# Patient Record
Sex: MALE | Race: ASIAN | NOT HISPANIC OR LATINO
[De-identification: names, ages, dates, MRNs, and addresses within clinical notes are randomized per-mention and may not be internally consistent; named-entity substitution may affect disease eponyms.]

---

## 2024-05-01 ENCOUNTER — NON-APPOINTMENT (OUTPATIENT)
Age: 54
End: 2024-05-01

## 2024-05-02 ENCOUNTER — NON-APPOINTMENT (OUTPATIENT)
Age: 54
End: 2024-05-02

## 2024-05-02 ENCOUNTER — APPOINTMENT (OUTPATIENT)
Dept: SURGICAL ONCOLOGY | Facility: CLINIC | Age: 54
End: 2024-05-02
Payer: COMMERCIAL

## 2024-05-02 VITALS
HEART RATE: 75 BPM | DIASTOLIC BLOOD PRESSURE: 97 MMHG | SYSTOLIC BLOOD PRESSURE: 158 MMHG | HEIGHT: 67 IN | RESPIRATION RATE: 17 BRPM | OXYGEN SATURATION: 97 % | WEIGHT: 175 LBS | BODY MASS INDEX: 27.47 KG/M2

## 2024-05-02 PROCEDURE — 99203 OFFICE O/P NEW LOW 30 MIN: CPT

## 2024-05-05 NOTE — HISTORY OF PRESENT ILLNESS
[de-identified] : Mr. Barrow is a 54 y/o male who presents for evaluation of an enlarging gallbladder polyp. Patient receives annual abdominal ultrasound as part of physical exam with PCP. US abdomen 02/20/2024 demonstrated multiple gallbladder polyp measuring up to 1.4 cm. MR abdomen 03/24/2024 showed gallbladder polyp measuring 1.1 cm. MR abdomen 02/02/2000 showed an unremarkable gallbladder. He has a slightly elevated AFP to 6.4 ng/mL.  Patient reports feeling well and is tolerating diet without nausea/emesis.  He denies abdominal pain or weight loss/fever.

## 2024-05-05 NOTE — REASON FOR VISIT
[Initial Consultation] : an initial consultation for [Referred By: ___] : Referred By: [unfilled] [FreeTextEntry2] : gallbladder polyp.

## 2024-05-05 NOTE — CONSULT LETTER
[Dear  ___] : Dear  [unfilled], [Consult Letter:] : I had the pleasure of evaluating your patient, [unfilled]. [Please see my note below.] : Please see my note below. [Consult Closing:] : Thank you very much for allowing me to participate in the care of this patient.  If you have any questions, please do not hesitate to contact me. [Sincerely,] : Sincerely, [FreeTextEntry2] : Dr. Antonio Barrow [DrRio  ___] : Dr. MOORE [FreeTextEntry3] : Thomas Valera (M) [phone] (O) [phone]

## 2024-05-05 NOTE — ASSESSMENT
[FreeTextEntry1] : 54 y/o male presents with gallbladder polyp over 1 cm. We discussed management of GB polyp. Cholecystectomy is indicated for GB polyp over 1 cm due to increased risk for associated malignancy. Recommend robotic cholecystectomy. Risks/benefits/surgical details and expected recovery explained. All questions answered.

## 2024-05-13 ENCOUNTER — OUTPATIENT (OUTPATIENT)
Dept: OUTPATIENT SERVICES | Facility: HOSPITAL | Age: 54
LOS: 1 days | End: 2024-05-13

## 2024-05-13 VITALS
OXYGEN SATURATION: 98 % | HEART RATE: 62 BPM | RESPIRATION RATE: 16 BRPM | DIASTOLIC BLOOD PRESSURE: 82 MMHG | HEIGHT: 67 IN | SYSTOLIC BLOOD PRESSURE: 142 MMHG | TEMPERATURE: 98 F | WEIGHT: 182.1 LBS

## 2024-05-13 DIAGNOSIS — K82.4 CHOLESTEROLOSIS OF GALLBLADDER: ICD-10-CM

## 2024-05-13 DIAGNOSIS — Z91.89 OTHER SPECIFIED PERSONAL RISK FACTORS, NOT ELSEWHERE CLASSIFIED: ICD-10-CM

## 2024-05-13 RX ORDER — SODIUM CHLORIDE 9 MG/ML
1000 INJECTION, SOLUTION INTRAVENOUS
Refills: 0 | Status: DISCONTINUED | OUTPATIENT
Start: 2024-05-17 | End: 2024-05-31

## 2024-05-13 NOTE — H&P PST ADULT - NSANTHOSAYNRD_GEN_A_CORE
never tested/No. CAN screening performed.  STOP BANG Legend: 0-2 = LOW Risk; 3-4 = INTERMEDIATE Risk; 5-8 = HIGH Risk

## 2024-05-13 NOTE — H&P PST ADULT - PROBLEM SELECTOR PLAN 1
53 yrs old male with h/o polyp in his gall bladder presents for preop eval to have robotic cholecystectomy on 5/17/24..  Labs done by pcp, will obtain copy  Preop instructions provided to pt.  Famotidine and chlorhexidine scrubs provided to pt with instructions.

## 2024-05-13 NOTE — H&P PST ADULT - SKIN/BREAST
[Follow-Up] : a follow-up visit [FreeTextEntry1] : video - asthma, allergies, GERD, JOSHUA, PNA history, poor sleep, postnasal drip, sicca, sleep disordered breathing, and shortness of breath negative

## 2024-05-13 NOTE — H&P PST ADULT - ASSESSMENT
53 yrs old male with h/o polyp in his gall bladder presents for preop eval to have robotic cholecystectomy on 5/17/24.

## 2024-05-13 NOTE — H&P PST ADULT - HISTORY OF PRESENT ILLNESS
53 yrs old male with h/o polyp in his gall bladder diagnosed during an ultrasound done in 2022 as pt had kidney stone. Repeat ultrasound this year showed that the polyp has grown in size and so pt ad MR. pt consulted a surgeon and cholecystectomy was recommended. Pt presents for preop eval to have robotic cholecystectomy on 5/17/24. 53 yrs old male with h/o polyp in his gall bladder diagnosed during an ultrasound done in 2022 as pt had kidney stone. Repeat ultrasound this year showed that the polyp has grown in size as per the pt and so pt ad MRI. pt consulted a surgeon and cholecystectomy was recommended. Pt presents for preop eval to have robotic cholecystectomy on 5/17/24.

## 2024-05-16 ENCOUNTER — TRANSCRIPTION ENCOUNTER (OUTPATIENT)
Age: 54
End: 2024-05-16

## 2024-05-17 ENCOUNTER — OUTPATIENT (OUTPATIENT)
Dept: OUTPATIENT SERVICES | Facility: HOSPITAL | Age: 54
LOS: 1 days | Discharge: HOME | End: 2024-05-17
Payer: COMMERCIAL

## 2024-05-17 ENCOUNTER — APPOINTMENT (OUTPATIENT)
Dept: SURGICAL ONCOLOGY | Facility: HOSPITAL | Age: 54
End: 2024-05-17

## 2024-05-17 ENCOUNTER — RESULT REVIEW (OUTPATIENT)
Age: 54
End: 2024-05-17

## 2024-05-17 ENCOUNTER — TRANSCRIPTION ENCOUNTER (OUTPATIENT)
Age: 54
End: 2024-05-17

## 2024-05-17 VITALS
DIASTOLIC BLOOD PRESSURE: 92 MMHG | WEIGHT: 182.1 LBS | TEMPERATURE: 98 F | SYSTOLIC BLOOD PRESSURE: 161 MMHG | HEIGHT: 67 IN | RESPIRATION RATE: 17 BRPM | OXYGEN SATURATION: 98 % | HEART RATE: 55 BPM

## 2024-05-17 VITALS
OXYGEN SATURATION: 97 % | RESPIRATION RATE: 16 BRPM | HEART RATE: 58 BPM | SYSTOLIC BLOOD PRESSURE: 133 MMHG | DIASTOLIC BLOOD PRESSURE: 74 MMHG

## 2024-05-17 DIAGNOSIS — K82.4 CHOLESTEROLOSIS OF GALLBLADDER: ICD-10-CM

## 2024-05-17 PROCEDURE — 47100 WEDGE BIOPSY OF LIVER: CPT

## 2024-05-17 PROCEDURE — 47562 LAPAROSCOPIC CHOLECYSTECTOMY: CPT

## 2024-05-17 PROCEDURE — 88341 IMHCHEM/IMCYTCHM EA ADD ANTB: CPT | Mod: 26

## 2024-05-17 PROCEDURE — 88307 TISSUE EXAM BY PATHOLOGIST: CPT | Mod: 26

## 2024-05-17 PROCEDURE — 47562 LAPAROSCOPIC CHOLECYSTECTOMY: CPT | Mod: AS

## 2024-05-17 PROCEDURE — 88342 IMHCHEM/IMCYTCHM 1ST ANTB: CPT | Mod: 26

## 2024-05-17 PROCEDURE — 88304 TISSUE EXAM BY PATHOLOGIST: CPT | Mod: 26

## 2024-05-17 PROCEDURE — 88313 SPECIAL STAINS GROUP 2: CPT | Mod: 26

## 2024-05-17 PROCEDURE — S2900 ROBOTIC SURGICAL SYSTEM: CPT | Mod: NC

## 2024-05-17 DEVICE — LIGATING CLIPS WECK HEMOLOK POLYMER MEDIUM-LARGE (GREEN) 6: Type: IMPLANTABLE DEVICE | Status: FUNCTIONAL

## 2024-05-17 RX ORDER — HYDROMORPHONE HYDROCHLORIDE 2 MG/ML
0.5 INJECTION INTRAMUSCULAR; INTRAVENOUS; SUBCUTANEOUS
Refills: 0 | Status: DISCONTINUED | OUTPATIENT
Start: 2024-05-17 | End: 2024-05-17

## 2024-05-17 RX ORDER — ATORVASTATIN CALCIUM 80 MG/1
1 TABLET, FILM COATED ORAL
Refills: 0 | DISCHARGE

## 2024-05-17 RX ORDER — OXYCODONE HYDROCHLORIDE 5 MG/1
5 TABLET ORAL ONCE
Refills: 0 | Status: DISCONTINUED | OUTPATIENT
Start: 2024-05-17 | End: 2024-05-17

## 2024-05-17 RX ORDER — OXYCODONE HYDROCHLORIDE 5 MG/1
1 TABLET ORAL
Qty: 8 | Refills: 0
Start: 2024-05-17

## 2024-05-17 RX ORDER — ONDANSETRON 8 MG/1
4 TABLET, FILM COATED ORAL ONCE
Refills: 0 | Status: COMPLETED | OUTPATIENT
Start: 2024-05-17 | End: 2024-05-17

## 2024-05-17 RX ORDER — CHOLECALCIFEROL (VITAMIN D3) 125 MCG
0 CAPSULE ORAL
Refills: 0 | DISCHARGE

## 2024-05-17 RX ADMIN — ONDANSETRON 4 MILLIGRAM(S): 8 TABLET, FILM COATED ORAL at 15:12

## 2024-05-17 NOTE — ASU DISCHARGE PLAN (ADULT/PEDIATRIC) - NS MD DC FALL RISK RISK
For information on Fall & Injury Prevention, visit: [email or web address] OR  [email or web address] OR  https://www.cdc.gov/steadi/patient.html

## 2024-05-17 NOTE — BRIEF OPERATIVE NOTE - COMMENTS
I, Sherry Lainez PA-C, served as the first assistant in this operation. I assisted in placing ports, docking, and targeting the [facility] Ale[facility] robot, first assisted at the surgical field while the surgeon was performing the operation at the robotic console by providing instrument exchanges, tissue retraction, suction and irrigation, specimen retrieval, passing and removing sutures, undocking the robotic platform, and closed surgical wounds.

## 2024-05-17 NOTE — BRIEF OPERATIVE NOTE - NSICDXBRIEFPREOP_GEN_ALL_CORE_FT
Anesthesia ROS/Med Hx    Overall Review:  Pts. EKG was reviewed and Pts.echo was reviewed   Pt. has no history of anesthetic complications    Pulmonary Review:    Pt. negative for sleep apnea   Pt. positive for COPD   Pt. negative for shortness of breath  Pt. negative for recent URI   The patient is a former smoker.     Neuro/Psych Review:    Pt. negative for seizures  Pt. negative for CVA    Cardiovascular Review:  Cardiovascular ROS notes: 2013 echo: EF 58%; no regional WMA's normal RV; Valves ok  Pt. negative for angina  Pt. negative for valvular problems/murmurs  Pt. negative for orthopnea  Pt. negative for PND  Pt. positive for WINTER  Pt. positive for hypertension    Pt. negative for dysrhythmias    GI/HEPATIC/RENAL Review:    Pt. positive for hepatitis (hc - states resolved)- type C  Pt. positive for liver disease  Pt. negative for renal disease    End/Other Review:    Pt. negative for diabetes  Pt. negative for hypothyroidism  Pt. negative for hyperthyroidism  Overall Review of Systems Comments:  EtOH hx        Anesthesia Plan     ASA Status: 3  Anesthesia Type: Regional  Reviewed: Lab Results, EKG, NPO Status, Allergies, Medications, Patient Summary, Problem List, Past Med History and DNR Status  The proposed anesthetic plan, including its risks and benefits, have been discussed with the Patient - along with the risks and benefits of alternatives.  Questions were encouraged and answered and the patient and/or representative agrees to proceed.  Blood Products: Not Anticipated  Plan Comments: Plan [place] block      Physical Exam  Mallampati: II  TM Distance: >3 FB  Neck ROM: Full  Cardio Rhythm: Regular  Cardio Rate: Normal  cardiovascular exam normal  Patient Demonstrates:  Decreased Breath Sounds  Pulmonary Note: Decreased BS throughout especially at bases with prolonged exp phase throughout  Patient has:  Lower dentures and Upper dentures                   PRE-OP DIAGNOSIS:  Gallbladder polyp 17-May-2024 13:20:42  Prasanth LIN

## 2024-05-17 NOTE — ASU PREOP CHECKLIST - IDENTIFICATION BAND VERIFIED
I don't have the xray shoulder result from wed. It was stat. Did we get a fax?   If yes, I need you to let me know what the  impression is.  Pls send it in a message here.    If no, pls call  radiology and obtain the result and then let me know the impression.   done

## 2024-05-17 NOTE — ASU DISCHARGE PLAN (ADULT/PEDIATRIC) - FOLLOW UP APPOINTMENTS
[facility] may also call Recovery Room (PACU) 24/7 @ [phone]/[facility]

## 2024-05-17 NOTE — BRIEF OPERATIVE NOTE - OPERATION/FINDINGS
critical view achieved. Cytic duct and artery taken with hemolocks. One lesion at the segment 4 surface of the liver, wedged.

## 2024-05-17 NOTE — ASU DISCHARGE PLAN (ADULT/PEDIATRIC) - NURSING INSTRUCTIONS
You received IV Tylenol for pain management at 1:15pm. Please DO NOT take any Tylenol (Acetaminophen) containing products, such as Vicodin, Percocet, Excedrin, and cold medications for the next 6 hours (until 7:15pm). DO NOT TAKE MORE THAN 4000 MG OF TYLENOL in a 24 hour period.

## 2024-05-17 NOTE — ASU DISCHARGE PLAN (ADULT/PEDIATRIC) - CARE PROVIDER_API CALL
Thomas Valera-Yeou  Surgery  [address] [place] [id number]  Phone: [phone]  Fax: [phone]  Follow Up Time: 2 weeks

## 2024-06-03 PROBLEM — K82.4 CHOLESTEROLOSIS OF GALLBLADDER: Chronic | Status: ACTIVE | Noted: 2024-05-13

## 2024-06-04 LAB — SURGICAL PATHOLOGY STUDY: SIGNIFICANT CHANGE UP

## 2024-06-13 ENCOUNTER — APPOINTMENT (OUTPATIENT)
Dept: SURGICAL ONCOLOGY | Facility: CLINIC | Age: 54
End: 2024-06-13
Payer: COMMERCIAL

## 2024-06-13 VITALS
SYSTOLIC BLOOD PRESSURE: 150 MMHG | BODY MASS INDEX: 27.31 KG/M2 | DIASTOLIC BLOOD PRESSURE: 90 MMHG | HEIGHT: 67 IN | WEIGHT: 174 LBS | OXYGEN SATURATION: 98 % | HEART RATE: 64 BPM

## 2024-06-13 DIAGNOSIS — K82.4 CHOLESTEROLOSIS OF GALLBLADDER: ICD-10-CM

## 2024-06-13 PROCEDURE — 99024 POSTOP FOLLOW-UP VISIT: CPT

## 2024-06-15 PROBLEM — K82.4 POLYP OF GALLBLADDER: Status: ACTIVE | Noted: 2024-05-05

## 2024-06-15 NOTE — ASSESSMENT
[FreeTextEntry1] : Recovering well from surgery. Pathology benign. Diet/activity as tolerated. Fatty/oily food avoidance as needed. May follow up in office as clinically indicated.

## 2024-06-15 NOTE — HISTORY OF PRESENT ILLNESS
[de-identified] : Mr. Barrow is a 52 y/o male who presents for evaluation of an enlarging gallbladder polyp. Patient receives annual abdominal ultrasound as part of physical exam with PCP. US abdomen 02/20/2024 demonstrated multiple gallbladder polyp measuring up to 1.4 cm. MR abdomen 03/24/2024 showed gallbladder polyp measuring 1.1 cm. MR abdomen 02/02/2000 showed an unremarkable gallbladder. He has a slightly elevated AFP to 6.4 ng/mL. Patient reports feeling well and is tolerating diet without nausea/emesis.  He denies abdominal pain or weight loss/fever.  06/13/2024: Patient is s/p robotic cholecystectomy 05/17/2024.  He is recovering well and denies abdominal pain.  He is tolerating diet well. 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 ASU/on unit ASU 2/on unit

## (undated) DEVICE — SPECIMEN CONTAINER 100ML

## (undated) DEVICE — XI ARM PERMANENT CAUTERY SPATULA

## (undated) DEVICE — XI ARM CLIP APPLIER MEDIUM-LARGE

## (undated) DEVICE — XI ARM FORCEP MARYLAND BIPOLAR

## (undated) DEVICE — ELCTR BOVIE TIP BLADE INSULATED 2.75" EDGE

## (undated) DEVICE — BLADE SCALPEL SAFETYLOCK #10

## (undated) DEVICE — VENODYNE/SCD SLEEVE CALF MEDIUM

## (undated) DEVICE — XI ARM GRASPER TIP UP FENESTRATED

## (undated) DEVICE — XI VESSEL SEALER

## (undated) DEVICE — POSITIONER FOAM HEAD CRADLE (PINK)

## (undated) DEVICE — PACK ROBOTIC LIJ

## (undated) DEVICE — MARKING PEN W RULER

## (undated) DEVICE — WARMING BLANKET UPPER ADULT

## (undated) DEVICE — TUBING SUCTION 20FT

## (undated) DEVICE — XI SEAL UNIVERSIAL 5-12MM

## (undated) DEVICE — TROCAR ETHICON ENDOPATH XCEL BLADELESS 5MM X 100MM STABILITY

## (undated) DEVICE — XI ARM DISSECTOR CURVED BIPOLAR 8MM

## (undated) DEVICE — XI ARM FORCEP FENESTRATED BIPOLAR 8MM

## (undated) DEVICE — WARMING BLANKET LOWER ADULT

## (undated) DEVICE — SUT MONOCRYL 4-0 27" PS-2 UNDYED

## (undated) DEVICE — SOL IRR POUR NS 0.9% 500ML

## (undated) DEVICE — XI ARM CLIP APPLIER LARGE

## (undated) DEVICE — GLV 7.5 PROTEXIS (WHITE)

## (undated) DEVICE — XI ARM NEEDLE DRIVER LARGE

## (undated) DEVICE — POSITIONER PURPLE ARM ONE STEP (LARGE)

## (undated) DEVICE — ELCTR BOVIE PENCIL SMOKE EVACUATION

## (undated) DEVICE — SUT VICRYL 0 27" UR-6

## (undated) DEVICE — XI ARM SCISSOR MONO CURVED

## (undated) DEVICE — FOLEY TRAY 16FR 5CC LTX UMETER CLOSED

## (undated) DEVICE — ENDOCATCH 10MM SPECIMEN POUCH

## (undated) DEVICE — XI TIP COVER

## (undated) DEVICE — XI ARM FORCEP PROGRASP 8MM

## (undated) DEVICE — SOL IRR POUR H2O 250ML

## (undated) DEVICE — VENODYNE/SCD SLEEVE FOOT

## (undated) DEVICE — POSITIONER FOAM EGG CRATE ULNAR 2PCS (PINK)

## (undated) DEVICE — XI DRAPE COLUMN

## (undated) DEVICE — XI ARM PERMANENT CAUTERY HOOK

## (undated) DEVICE — TUBING AIRSEAL TRI-LUMEN FILTERED

## (undated) DEVICE — XI OBTURATOR OPTICAL BLADELESS 8MM

## (undated) DEVICE — XI ARM FORCEP CADIERE 8MM

## (undated) DEVICE — XI ARM FORCEP TENACULUM

## (undated) DEVICE — GLV 7 PROTEXIS (WHITE)

## (undated) DEVICE — XI DRAPE ARM

## (undated) DEVICE — PREP CHLORAPREP HI-LITE ORANGE 26ML

## (undated) DEVICE — TUBING STRYKEFLOW II SUCTION / IRRIGATOR

## (undated) DEVICE — D HELP - CLEARVIEW CLEARIFY SYSTEM